# Patient Record
Sex: FEMALE | Race: WHITE
[De-identification: names, ages, dates, MRNs, and addresses within clinical notes are randomized per-mention and may not be internally consistent; named-entity substitution may affect disease eponyms.]

---

## 2023-01-20 ENCOUNTER — NURSE TRIAGE (OUTPATIENT)
Dept: OTHER | Facility: CLINIC | Age: 48
End: 2023-01-20

## 2023-01-20 NOTE — TELEPHONE ENCOUNTER
Location of patient: [address]     Received call from [address] [facility] at [facility] with [employer]. Needs to establish care with PCP    Subjective: Caller states lower right thumb,extreme pain with any movement,less  pain in same location left hand, if hits certain way  Right thumb worse than left     Current Symptoms: Pain lower joint right thumb,excruciated with movement, is wearing a brace on right hand   Denies redness or swelling     Onset: right hand pain with movement 5/6 days ago     Associated Symptoms: NA    Pain Severity: with movement 10/10 right thumb    Temperature:Denies    What has been tried:     LMP:  12/19/2022  Pregnant: No    Recommended disposition: See PCP within 3 Days    Care advice provided, patient verbalizes understanding; denies any other questions or concerns; instructed to call back for any new or worsening symptoms. Patient/Caller agrees with recommended disposition; writer provided warm transfer to [facility] at [facility] for appointment scheduling    Attention Provider: Thank you for allowing me to participate in the care of your patient. The patient was connected to triage in response to information provided to the E[facility]/PSC. Please do not respond through this encounter as the response is not directed to a shared pool.     Reason for Disposition   MODERATE pain (e.g., interferes with normal activities) and present > 3 days    Protocols used: Finger Pain-ADULT-[place]